# Patient Record
Sex: FEMALE | Race: WHITE | URBAN - METROPOLITAN AREA
[De-identification: names, ages, dates, MRNs, and addresses within clinical notes are randomized per-mention and may not be internally consistent; named-entity substitution may affect disease eponyms.]

---

## 2017-03-06 ENCOUNTER — OFFICE VISIT (OUTPATIENT)
Dept: OBGYN CLINIC | Facility: CLINIC | Age: 67
End: 2017-03-06

## 2017-03-06 VITALS
DIASTOLIC BLOOD PRESSURE: 78 MMHG | HEIGHT: 66 IN | SYSTOLIC BLOOD PRESSURE: 132 MMHG | WEIGHT: 131.38 LBS | BODY MASS INDEX: 21.11 KG/M2

## 2017-03-06 DIAGNOSIS — Z12.31 SCREENING MAMMOGRAM, ENCOUNTER FOR: ICD-10-CM

## 2017-03-06 DIAGNOSIS — R29.890 HEIGHT LOSS: ICD-10-CM

## 2017-03-06 DIAGNOSIS — Z78.0 POSTMENOPAUSAL ESTROGEN DEFICIENCY: ICD-10-CM

## 2017-03-06 DIAGNOSIS — Z12.11 ENCOUNTER FOR SCREENING COLONOSCOPY: Primary | ICD-10-CM

## 2017-03-06 DIAGNOSIS — Z00.00 ENCOUNTER FOR MEDICARE ANNUAL WELLNESS EXAM: ICD-10-CM

## 2017-03-06 DIAGNOSIS — N64.59 INVERTED NIPPLE: ICD-10-CM

## 2017-03-06 DIAGNOSIS — Z01.419 ENCOUNTER FOR WELL WOMAN EXAM WITH ROUTINE GYNECOLOGICAL EXAM: ICD-10-CM

## 2017-03-06 DIAGNOSIS — Z91.89 AT RISK FOR OSTEOPOROSIS: ICD-10-CM

## 2017-03-06 PROCEDURE — G0101 CA SCREEN;PELVIC/BREAST EXAM: HCPCS | Performed by: ADVANCED PRACTICE MIDWIFE

## 2017-03-06 PROCEDURE — 99387 INIT PM E/M NEW PAT 65+ YRS: CPT | Performed by: ADVANCED PRACTICE MIDWIFE

## 2017-03-06 NOTE — PROGRESS NOTES
HPI:    Patient ID: [place] is a 78year old female. who presents for a complete physical exam. Reports no physical exam, mammogram, pap smear in 10 years. Symptoms: right nipple reinverted 8 months ago.  Patient complains of left back spasm for 2 w Constitutional: She is oriented to person, place, and time. She appears well-developed and well-nourished. HENT:   Head: Normocephalic and atraumatic.    Right Ear: External ear normal.   Left Ear: External ear normal.   Mouth/Throat: Oropharynx is clear Skin: Skin is warm and dry. Psychiatric: She has a normal mood and affect.  Her behavior is normal. Thought content normal.              ASSESSMENT/PLAN:   Annual exam  Inverted right nipple  Loss of Height  Postmenopausal estrogen deficiency   Back spasm

## 2017-03-09 LAB — HPV I/H RISK 1 DNA SPEC QL NAA+PROBE: NEGATIVE

## 2017-04-11 ENCOUNTER — TELEPHONE (OUTPATIENT)
Dept: OBGYN CLINIC | Facility: CLINIC | Age: 67
End: 2017-04-11

## 2017-04-11 NOTE — TELEPHONE ENCOUNTER
LMTCB patient has overdue labs given by vikas on 3/6/17.  Please see if patient is having testing done or had it done else where

## 2017-04-12 NOTE — TELEPHONE ENCOUNTER
Pt states she plans on completing the blood work ordered by SJARLE[facility] in about a week. Pt was out of town and then got a cold. Pt was advsied she will need to fast for 12 hrs prior to having the Lipid Panel done.   Pt wants to know if [facility] was able to enter the

## 2020-09-11 ENCOUNTER — OFFICE VISIT (OUTPATIENT)
Dept: OTOLARYNGOLOGY | Facility: CLINIC | Age: 70
End: 2020-09-11
Payer: MEDICARE

## 2020-09-11 ENCOUNTER — OFFICE VISIT (OUTPATIENT)
Dept: AUDIOLOGY | Facility: CLINIC | Age: 70
End: 2020-09-11
Payer: MEDICARE

## 2020-09-11 VITALS — HEIGHT: 68 IN | TEMPERATURE: 98 F | WEIGHT: 131.81 LBS | BODY MASS INDEX: 19.98 KG/M2

## 2020-09-11 DIAGNOSIS — H91.93 BILATERAL HEARING LOSS, UNSPECIFIED HEARING LOSS TYPE: Primary | ICD-10-CM

## 2020-09-11 DIAGNOSIS — H90.3 SENSORINEURAL HEARING LOSS, BILATERAL: Primary | ICD-10-CM

## 2020-09-11 PROCEDURE — 92557 COMPREHENSIVE HEARING TEST: CPT | Performed by: AUDIOLOGIST

## 2020-09-11 PROCEDURE — 92567 TYMPANOMETRY: CPT | Performed by: AUDIOLOGIST

## 2020-09-14 NOTE — PROGRESS NOTES
Noemi Haddad is a 69year old female.  Patient presents with:  Throat Problem: post nasal drip, congestion for  awhile  Hearing Loss: decreased hearing in both ears for 2 years    HPI:   She has been experiencing a slow gradual decline in her hearing for m Posterior cervical. Supraclavicular.    Eyes Normal Conjunctiva - Right: Normal, Left: Normal. Pupil - Right: Normal, Left: Normal.    Ears Normal Inspection - Right: Normal, Left: Normal. Canal - Left: Normal. TM - Right: Normal, Left: Normal   ear canals

## 2020-09-15 NOTE — PROGRESS NOTES
AUDIOLOGY REPORT      Judith Torres is a 69year old female     Referring Provider: Bossman Garcias   YOB: 1950  Medical Record: [id number]      Patient Hearing History:  Patient reported gradual onset of hearing difficulties.        Otoscopic

## 2021-03-01 ENCOUNTER — OFFICE VISIT (OUTPATIENT)
Dept: OTOLARYNGOLOGY | Facility: CLINIC | Age: 71
End: 2021-03-01
Payer: MEDICARE

## 2021-03-01 VITALS
SYSTOLIC BLOOD PRESSURE: 127 MMHG | TEMPERATURE: 98 F | DIASTOLIC BLOOD PRESSURE: 71 MMHG | BODY MASS INDEX: 21.19 KG/M2 | WEIGHT: 135 LBS | HEIGHT: 67 IN

## 2021-03-01 DIAGNOSIS — R09.81 NASAL CONGESTION: Primary | ICD-10-CM

## 2021-03-01 RX ORDER — ALBUTEROL SULFATE 90 UG/1
AEROSOL, METERED RESPIRATORY (INHALATION)
COMMUNITY

## 2021-03-01 RX ORDER — PANTOPRAZOLE SODIUM 40 MG/1
TABLET, DELAYED RELEASE ORAL
COMMUNITY

## 2021-03-01 RX ORDER — PANTOPRAZOLE SODIUM 40 MG/1
40 TABLET, DELAYED RELEASE ORAL DAILY
COMMUNITY
Start: 2021-02-23

## 2021-03-01 RX ORDER — MONTELUKAST SODIUM 10 MG/1
10 TABLET ORAL NIGHTLY
Qty: 30 TABLET | Refills: 3 | Status: SHIPPED | OUTPATIENT
Start: 2021-03-01

## 2021-03-01 RX ORDER — ALBUTEROL SULFATE 90 UG/1
AEROSOL, METERED RESPIRATORY (INHALATION)
COMMUNITY
Start: 2021-02-23

## 2021-03-02 NOTE — PROGRESS NOTES
Moody Dobbins is a 69year old female. Patient presents with:  Nose Problem: c/o nasal congestion    HPI:   She has been very congested and is having a light cough over the last several months. She is not having any fevers or chills or sweats.   She has tr Skull - Normal.   Oral/Oropharynx Normal Lips - Normal, Tonsils - Normal, Tongue - Normal indirect laryngoscopy shows no lesions in the hypopharynx or larynx    Nasal Normal External nose - Normal. Nasal septum - Normal, Turbinates - Normal.  Clear with no

## 2023-09-23 ENCOUNTER — HOSPITAL ENCOUNTER (OUTPATIENT)
Age: 73
Discharge: HOME | End: 2023-09-23
Payer: MEDICARE

## 2023-09-23 VITALS
RESPIRATION RATE: 14 BRPM | HEART RATE: 77 BPM | TEMPERATURE: 98 F | OXYGEN SATURATION: 100 % | SYSTOLIC BLOOD PRESSURE: 145 MMHG | DIASTOLIC BLOOD PRESSURE: 72 MMHG

## 2023-09-23 DIAGNOSIS — L08.9 INFECTED SKIN TEAR: Primary | ICD-10-CM

## 2023-09-23 DIAGNOSIS — T14.8XXA INFECTED SKIN TEAR: Primary | ICD-10-CM

## 2023-09-23 PROCEDURE — 99203 OFFICE O/P NEW LOW 30 MIN: CPT | Performed by: NURSE PRACTITIONER

## 2023-09-23 RX ORDER — CEPHALEXIN 500 MG/1
500 CAPSULE ORAL 3 TIMES DAILY
Qty: 30 CAPSULE | Refills: 0 | Status: SHIPPED | OUTPATIENT
Start: 2023-09-23 | End: 2023-10-03

## (undated) NOTE — MR AVS SNAPSHOT
After Visit Summary   3/6/2017    Ifrah Yu    MRN: [id number]           Visit Information        Provider Department Dept Phone    3/6/2017 11:30 AM [facility] [phone]      Your Vitals Were     BP Ht Wt BMI Breastfeed XR DEXA BONE DENSITOMETRY (CPT=77080) [id number] CPT(R)]  3/6/2017 (Approximate) 3/6/2018      Follow-up Instructions     Return in about 1 year (around 3/6/2018).       Imaging Scheduling Instructions     Monday March 06, 2017     Imaging:  [facility] [employer] insurance company's guidelines for prior authorization for this test.  You may be held responsible for payment in full if proper authorization is not acquired.   Please contact the Patient Business Office at [phone] if you have   any questions related box under the *New User? section. Enter your [employer] Activation Code exactly as it appears below. You will not need to use this code after you have completed the sign-up process.  If you do not sign up before the expiration date, you must request a new

## (undated) NOTE — Clinical Note
3/13/2017              Melania Cortez        [address]         Dear Ritu Garcia,    It was a pleasure to see you. Your PAP test was normal.  There is no need for further testing at this time.   I look forward to seeing y